# Patient Record
(demographics unavailable — no encounter records)

---

## 2024-10-15 NOTE — ASSESSMENT
[FreeTextEntry1] :  Prior to beginning the procedure, patient identity was verified, as well as the procedure to be performed and the site. All equipment required was ready and available. The patient was positioned appropriately.   Preoperative size: 1.2  cm Margins: 2mm cm Excision size: 1.6 cm Final length: 3.8 cm   Complications: none   Estimated blood loss: less than 5cc   Indications: The nature of the diagnosis was discussed with the patient and the details of the procedure, alternatives, risks were discussed with the patient, and all questions were answered. Specifically, risks including, but not limited to, infection, bleeding, incomplete excision, recurrence, nerve damage, scarring, dehiscence and poor cosmetic result were explained, and the patient expressed understanding.  After both written and verbal informed consent was obtained the procedure was carried out.   Procedure: The patient was placed in the supine position on the procedure room table.  The above location of the lesion was identified with the patient.  After alcohol prep, the area was anesthetized with 1% Lidocaine with 1:100,000 Epinephrine. It was then scrubbed with chlorhexidine, rinsed with sterile saline, and sterile drapes were placed around the operative site. The lesion with margins was excised with a 15 blade scalpel in fusiform elliptical shape down to the level of subcutis.  The wound edges were then undermined. Hemostasis was obtained with spot electrodesiccation. The subcutaneous fat and dermis were then carefully reapproximated using 4-0 PDS and 4-0 monocryl buried vertical mattress sutures.  The epidermis was closed using dermabond glue.   The wound was dressed and wound care instructions were given.   Patient reports no pain after the procedure.   The patient was advised to limit heavy lifting and straining for the next month to decrease risk of dehiscence.

## 2024-11-25 NOTE — PHYSICAL EXAM
[General Appearance - Alert] : alert [General Appearance - In No Acute Distress] : in no acute distress [Oriented To Time, Place, And Person] : oriented to person, place, and time [Affect] : the affect was normal [Mood] : the mood was normal [Person] : oriented to person [Place] : oriented to place [Time] : oriented to time [Cranial Nerves Oculomotor (III)] : extraocular motion intact [Cranial Nerves Trigeminal (V)] : facial sensation intact symmetrically [Cranial Nerves Facial (VII)] : face symmetrical [Cranial Nerves Accessory (XI - Cranial And Spinal)] : head turning and shoulder shrug symmetric [Cranial Nerves Hypoglossal (XII)] : there was no tongue deviation with protrusion [Motor Strength] : muscle strength was normal in all four extremities [Motor Handedness Right-Handed] : the patient is right hand dominant [Sensation Tactile Decrease] : light touch was intact [Sclera] : the sclera and conjunctiva were normal [Outer Ear] : the ears and nose were normal in appearance [Neck Appearance] : the appearance of the neck was normal [] : no respiratory distress [Respiration, Rhythm And Depth] : normal respiratory rhythm and effort [Abnormal Walk] : normal gait [Skin Color & Pigmentation] : normal skin color and pigmentation [Tremor] : a tremor present [FreeTextEntry8] : b/l rest tremor, postural with arms extended: 5-10 cm b/l, winged: 10 cm b/l, finger to nose: 5-10 cm b/l. Able to tandem gait. Can't tandem stance. Can stand with feet together side by side.

## 2024-11-25 NOTE — HISTORY OF PRESENT ILLNESS
[de-identified] : MARTHA DONALD is a 60 year old right handed female with PMH of essential tremor, tachycardia. She is not on anticoagulants or antiplatelets. She presents to the office for neurosurgical consultation for  high intensity focused ultrasound. She most recently was under the care of Dr. Guerra, but is not currently seeing anyone. Her tremor started in childhood. She was adopted so unsure of family history. She started seeing neurologist in elementary school She tried propranolol in high school with no relief. Tremor progressively worsened over time. Tremor is worse on right hand but present b/l. Denies head, jaw, or voice tremor. Tremor worsens with stress and caffeine, but she limits her intake. She drinks alcohol which improves her tremor. Brushing her teeth, applying eye makeup, and writing are very difficult. She can't use laptop, needs to rest hands to use keyboard. She can't  a glass and drink from it anymore.   She was evaluated by Dr. Damon at [facility] for HIFU  in 2022 but had a problem with insurance approval.  She tried primidone, gabapentin, Topamax, clonazepam with no relief or she couldn't tolerate.

## 2024-11-25 NOTE — ASSESSMENT
[FreeTextEntry1] : 60F with b/l hand tremor since childhood that has progressively worsened over time. She has tried many medications including propranolol, primidone, gabapentin, topamax, clonazepam which she couldn't tolerate due to adverse effects. Her tremor affects her QOL and she would like HIFU.  She has been workup up for HIFU in 2022 by Dr. Damon. Her reported SDR is 0.47. Will upload CT head and confirm with [employer].  I have discussed this patients symptoms with them. I have discussed how the symptoms of this patient's essential tremor are disabling. The symptoms significantly interfere with her quality of life. I think that this patient could benefit from HIFU or b/l VIM-DRTt DBS depending on the degree of tremor relief she is seeking.   Discussed MRI focused ultrasound in office today as well as deep brain stimulation: With MR guided focused ultrasound, a lesion is made in brain that is pathologically related to cause of tremor. If pathological track is targeted by creating lesion/burn in area along white matter tract, it will also treat tremor. The ventral intermediate nucleus of the thalamus is area that is targeted during procedure.  High intensity focused ultrasound is a procedure that  occurs in MRI magnet, where a series of MRIs are performed and the target is identified and ultrasound therapy is targeted at identified area. Head would be completely shaved. Lesions are irreversible. FDA approved for unilateral treatment. If this option chosen, side of the brain correlated with worse symptoms would be targeted. The other side can be treated 9-12 months later. This is procedure, not a surgery, not performed under anesthesia. HIFU would only improve tremor on one hand, and would not improve voice tremor, or head tremor, as bilateral treatment needed. CT head (HIFU protocol) would be performed to determine skull density ratio and true candidacy.  Side effects are possible including tingling around face on one side or around lips or hand. That may persist up to several months after procedure. May be faint to severe. May attenuate over a year following procedure. Weakness possible in addition to paraesthesias. Side effect may merlyn over time. Very common to have unsteady gait after procedure. May be discharged with a walker as a fall precaution. Efficacy of tremor suppression wanes after 1 year and attenuates over time. May need another procedure in the future.   DBS is surgery and can be performed bilaterally. The patient is awake for part of surgery but sedated for most of it. Head would be completely shaved. Electrodes are placed through naresh holes, side effects and benefit are assessed intraoperatively. Electrode placement adjusted in OR for optimal placement. DBS is adaptable and programming settings can be adjusted over time with disease progression. This is reversible. DBS has high tremor efficacy, tremor should be greatly reduced. Head tremor should also improve with b/l stimulation, although voice tremor does not always improve. Pt has to be surgery candidate and able to tolerate. Has to be off blood thinners if cleared by cardiologist. Risk of bleeding, infection, seizure, stroke with surgery. DBS is performed in 2 stages ~2 weeks apart. Stage 1 is implantation of DBS electrodes in the brain. Pt stays 1 night in the hospital and is likely discharged home the following day. Stage 2 is an outpatient surgery for placement of lead extension and IPG. This is done at the [facility] [place]. Pt is discharged the same day with pain medication and ~1 week of antibiotics.   Patient and family to consider both options. Neither treatment option cures underlying disease process.  PLAN: -Pt will consider treatment options and contact office so referrals can be made.  I, Dr. Uriel Hood, personally performed the evaluation and management (E/M) services for this new patient.  That E/M includes conducting the clinically appropriate initial history &/or exam, assessing all conditions, and establishing the plan of care.  Today, my MAURA, Rosa Lloyd, was here to observe my evaluation and management service for this patient & follow plan of care established by me going forward.

## 2024-11-25 NOTE — HISTORY OF PRESENT ILLNESS
[de-identified] : MARTHA DONALD is a 60 year old right handed female with PMH of essential tremor, tachycardia. She is not on anticoagulants or antiplatelets. She presents to the office for neurosurgical consultation for  high intensity focused ultrasound. She most recently was under the care of Dr. Guerra, but is not currently seeing anyone. Her tremor started in childhood. She was adopted so unsure of family history. She started seeing neurologist in elementary school She tried propranolol in high school with no relief. Tremor progressively worsened over time. Tremor is worse on right hand but present b/l. Denies head, jaw, or voice tremor. Tremor worsens with stress and caffeine, but she limits her intake. She drinks alcohol which improves her tremor. Brushing her teeth, applying eye makeup, and writing are very difficult. She can't use laptop, needs to rest hands to use keyboard. She can't  a glass and drink from it anymore.   She was evaluated by Dr. Damon at [facility] for HIFU  in 2022 but had a problem with insurance approval.  She tried primidone, gabapentin, Topamax, clonazepam with no relief or she couldn't tolerate.

## 2024-11-25 NOTE — ASSESSMENT
[FreeTextEntry1] : 60F with b/l hand tremor since childhood that has progressively worsened over time. She has tried many medications including propranolol, primidone, gabapentin, topamax, clonazepam which she couldn't tolerate due to adverse effects. Her tremor affects her QOL and she would like HIFU.  She has been workup up for HIFU in 2022 by Dr. Damon. Her reported SDR is 0.47. Will upload CT head and confirm with [employer].  I have discussed this patients symptoms with them. I have discussed how the symptoms of this patient's essential tremor are disabling. The symptoms significantly interfere with her quality of life. I think that this patient could benefit from HIFU or b/l VIM-DRTt DBS depending on the degree of tremor relief she is seeking.   Discussed MRI focused ultrasound in office today as well as deep brain stimulation: With MR guided focused ultrasound, a lesion is made in brain that is pathologically related to cause of tremor. If pathological track is targeted by creating lesion/burn in area along white matter tract, it will also treat tremor. The ventral intermediate nucleus of the thalamus is area that is targeted during procedure.  High intensity focused ultrasound is a procedure that  occurs in MRI magnet, where a series of MRIs are performed and the target is identified and ultrasound therapy is targeted at identified area. Head would be completely shaved. Lesions are irreversible. FDA approved for unilateral treatment. If this option chosen, side of the brain correlated with worse symptoms would be targeted. The other side can be treated 9-12 months later. This is procedure, not a surgery, not performed under anesthesia. HIFU would only improve tremor on one hand, and would not improve voice tremor, or head tremor, as bilateral treatment needed. CT head (HIFU protocol) would be performed to determine skull density ratio and true candidacy.  Side effects are possible including tingling around face on one side or around lips or hand. That may persist up to several months after procedure. May be faint to severe. May attenuate over a year following procedure. Weakness possible in addition to paraesthesias. Side effect may merlyn over time. Very common to have unsteady gait after procedure. May be discharged with a walker as a fall precaution. Efficacy of tremor suppression wanes after 1 year and attenuates over time. May need another procedure in the future.   DBS is surgery and can be performed bilaterally. The patient is awake for part of surgery but sedated for most of it. Head would be completely shaved. Electrodes are placed through naresh holes, side effects and benefit are assessed intraoperatively. Electrode placement adjusted in OR for optimal placement. DBS is adaptable and programming settings can be adjusted over time with disease progression. This is reversible. DBS has high tremor efficacy, tremor should be greatly reduced. Head tremor should also improve with b/l stimulation, although voice tremor does not always improve. Pt has to be surgery candidate and able to tolerate. Has to be off blood thinners if cleared by cardiologist. Risk of bleeding, infection, seizure, stroke with surgery. DBS is performed in 2 stages ~2 weeks apart. Stage 1 is implantation of DBS electrodes in the brain. Pt stays 1 night in the hospital and is likely discharged home the following day. Stage 2 is an outpatient surgery for placement of lead extension and IPG. This is done at the [facility] [place]. Pt is discharged the same day with pain medication and ~1 week of antibiotics.   Patient and family to consider both options. Neither treatment option cures underlying disease process.  PLAN: -Pt will consider treatment options and contact office so referrals can be made.  I, Dr. Uriel Hood, personally performed the evaluation and management (E/M) services for this new patient.  That E/M includes conducting the clinically appropriate initial history &/or exam, assessing all conditions, and establishing the plan of care.  Today, my MAURA, Rosa Lloyd, was here to observe my evaluation and management service for this patient & follow plan of care established by me going forward.

## 2025-01-15 NOTE — HISTORY OF PRESENT ILLNESS
[FreeTextEntry1] : 60 year old female presents for tremor evaluation Patient was referred by Dr Hood, she is scheduled to undergo HIFU on 2/13/25  She has tried multiple medication for her tremors in the past without improvement.  Last seen by Dr Lim in 2024

## 2025-01-15 NOTE — PHYSICAL EXAM
[General Appearance - Alert] : alert [Oriented To Time, Place, And Person] : oriented to person, place, and time [FreeTextEntry1] : no masking Vertical eye movements intact without square wave jerks normal speech 0 Rigidity of neck rotation 0 Rigidity of limbs present with contralateral activation  high amplitude tremor at rest L>R  Right arm internally rotates when patient is walking Severe high amplitude action tremor Severe high amplitude L>R Postural tremor  0 Bradykinesia with finger tapping, hand supination/pronation, foot tapping, foot stomping. No dysmetria with finger to nose Gait: able to stand with hands crossed and without assistance normal posture Normal gait initiation, normal stride length, Inward right arm posturing, multistep turns  able to tandem walk Handwriting is slightly illegible due to hand tremors  DARIEL 1,2 & 3 score: 2 Tetras ADL subscale: 29/48 Tetras performance subscale:36.5/64

## 2025-01-15 NOTE — DISCUSSION/SUMMARY
[FreeTextEntry1] : 60 year old female presents for tremor grading. Scheduled for HIFU on 2/13/25.   F/u 3 months for repeat tremor grading

## 2025-03-18 NOTE — ASSESSMENT
[FreeTextEntry1] : 60F with b/l hand tremor since childhood that has progressively worsened over time. She has tried many medications including propranolol, primidone, gabapentin, topamax, clonazepam which she couldn't tolerate due to adverse effects. Her tremor affects her QOL. She is s/p HIFU left VIM thalamus 2/13/25.   Pt has significant right hand tremor at this point. Any side effects r/t swelling should continue to improve as swelling around lesion subsides. Her side effects have improved since immediately post procedure.   PLAN: -3 month post procedure MR head no cont  -3 month post procedure tremor grading with neurology -Return to office in 2 months

## 2025-03-18 NOTE — HISTORY OF PRESENT ILLNESS
[de-identified] : MARTHA DONALD is a 60 year old right handed female with PMH of essential tremor, tachycardia. She is not on anticoagulants or antiplatelets. She presents to the office for neurosurgical consultation for  high intensity focused ultrasound. She most recently was under the care of Dr. Guerra, but is not currently seeing anyone. Her tremor started in childhood. She was adopted so unsure of family history. She started seeing neurologist in elementary school She tried propranolol in high school with no relief. Tremor progressively worsened over time. Tremor is worse on right hand but present b/l. Denies head, jaw, or voice tremor. Tremor worsens with stress and caffeine, but she limits her intake. She drinks alcohol which improves her tremor. Brushing her teeth, applying eye makeup, and writing are very difficult. She can't use laptop, needs to rest hands to use keyboard. She can't  a glass and drink from it anymore.   She was evaluated by Dr. Damon at [facility] for HIFU  in 2022 but had a problem with insurance approval.  She tried primidone, gabapentin, Topamax, clonazepam with no relief or she couldn't tolerate. She underwent HIFU left VIM thalamus on 2/13/25.   Today she presents for 1 month HIFU follow up. She denies numbness/tingling or taste disturbance.  Her gait/balance have improved but she feels coordination on right is off a little either when walking her dog or chopping food. She had great tremor improvement post procedure but tremor in right hand started to return a little a few days ago but she reports it is still about 85% better than baseline. Her head numbness has improved.

## 2025-03-18 NOTE — PHYSICAL EXAM
[General Appearance - Alert] : alert [General Appearance - In No Acute Distress] : in no acute distress [Oriented To Time, Place, And Person] : oriented to person, place, and time [Affect] : the affect was normal [Mood] : the mood was normal [Person] : oriented to person [Place] : oriented to place [Time] : oriented to time [Cranial Nerves Oculomotor (III)] : extraocular motion intact [Cranial Nerves Trigeminal (V)] : facial sensation intact symmetrically [Cranial Nerves Facial (VII)] : face symmetrical [Cranial Nerves Accessory (XI - Cranial And Spinal)] : head turning and shoulder shrug symmetric [Cranial Nerves Hypoglossal (XII)] : there was no tongue deviation with protrusion [Motor Strength] : muscle strength was normal in all four extremities [Sensation Tactile Decrease] : light touch was intact [Sclera] : the sclera and conjunctiva were normal [Outer Ear] : the ears and nose were normal in appearance [Neck Appearance] : the appearance of the neck was normal [] : no respiratory distress [Respiration, Rhythm And Depth] : normal respiratory rhythm and effort [Abnormal Walk] : normal gait [Skin Color & Pigmentation] : normal skin color and pigmentation [FreeTextEntry8] : postural and kinetic tremor L>R

## 2025-06-09 NOTE — HISTORY OF PRESENT ILLNESS
[de-identified] : Had procedure for R side essential tremor Feb 2025. She states it was quite an ordeal but there's been a huge improvement in her tremor, huge improvement in quality of life particularly with daily eating.  Needs gyn referral, mammo.   Half English muffin at 7:30 AM.  [FreeTextEntry1] : 60 year old woman presenting for CPE

## 2025-06-09 NOTE — HISTORY OF PRESENT ILLNESS
[de-identified] : Had procedure for R side essential tremor Feb 2025. She states it was quite an ordeal but there's been a huge improvement in her tremor, huge improvement in quality of life particularly with daily eating.  Needs gyn referral, mammo.   Half English muffin at 7:30 AM.  [FreeTextEntry1] : 60 year old woman presenting for CPE